# Patient Record
(demographics unavailable — no encounter records)

---

## 2024-11-11 NOTE — HISTORY OF PRESENT ILLNESS
[de-identified] : Patient is a 66-year-old female here for evaluation of right shoulder.  Patient states that about 4 days ago she woke up with right shoulder pain that was minimal.  Patient states that she was able to do her daily activities with mild discomfort.  Patient states every day the pain has been worsening along with worsening range of motion.  Patient went to urgent care yesterday and was given a cortisone injection in the buttocks.  Patient states this injection has slightly worked, otherwise patient has been using sling for support.  Denies numbness tingling  Right shoulder exam: No effusion no ecchymosis no erythema tenderness palpation to anterior glenohumeral joint, lateral shoulder.  Range of motion moderately decreased in all planes with mild decreased strength, negative drop arm test, positive Jobes, positive Frederick, no gross instability neurovascular intact  X-ray right shoulder 3 views downloaded to PACS system from urgent care: No acute fractures consultations, dislocations.  Noted degenerative changes  Discussed with patient detail that her symptoms are consistent with adhesive capsulitis of the right shoulder.  Discussed treatment options in detail including rest, ice/heat range of motion excise physical therapy, physical therapy or given.  Meloxicam sent to patient's pharmacy discussed that effects in detail.  We will hold off cortisone injection today since patient just had a cortisone injection yesterday.  Patient will call office if her symptoms are worsening or changing.  Will follow-up in 4 weeks for reevaluation with sports department for possible cortisone injection versus MRI.  Patient understands agrees with plan.  Advised patient to come out of sling and do gentle range of motion exercises.  Discussed with patient if she stays in sling and will likely worsen her range of motion, worsen stiffness.